# Patient Record
Sex: MALE | Race: OTHER | HISPANIC OR LATINO | Employment: UNEMPLOYED | URBAN - METROPOLITAN AREA
[De-identification: names, ages, dates, MRNs, and addresses within clinical notes are randomized per-mention and may not be internally consistent; named-entity substitution may affect disease eponyms.]

---

## 2019-02-27 ENCOUNTER — HOSPITAL ENCOUNTER (EMERGENCY)
Facility: HOSPITAL | Age: 24
Discharge: HOME | End: 2019-02-27
Attending: EMERGENCY MEDICINE | Admitting: EMERGENCY MEDICINE

## 2019-02-27 VITALS
DIASTOLIC BLOOD PRESSURE: 65 MMHG | HEIGHT: 72 IN | BODY MASS INDEX: 32.31 KG/M2 | SYSTOLIC BLOOD PRESSURE: 117 MMHG | TEMPERATURE: 98.1 F | HEART RATE: 80 BPM | RESPIRATION RATE: 16 BRPM | WEIGHT: 238.54 LBS | OXYGEN SATURATION: 98 %

## 2019-02-27 DIAGNOSIS — R51.9 ACUTE INTRACTABLE HEADACHE, UNSPECIFIED HEADACHE TYPE: Primary | ICD-10-CM

## 2019-02-27 PROCEDURE — 96365 THER/PROPH/DIAG IV INF INIT: CPT

## 2019-02-27 PROCEDURE — 96361 HYDRATE IV INFUSION ADD-ON: CPT

## 2019-02-27 PROCEDURE — 99283 EMERGENCY DEPT VISIT LOW MDM: CPT

## 2019-02-27 PROCEDURE — 96375 TX/PRO/DX INJ NEW DRUG ADDON: CPT

## 2019-02-27 PROCEDURE — 96367 TX/PROPH/DG ADDL SEQ IV INF: CPT

## 2019-02-27 RX ORDER — MORPHINE SULFATE 4 MG/ML
6 INJECTION, SOLUTION INTRAMUSCULAR; INTRAVENOUS ONCE
Status: COMPLETED | OUTPATIENT
Start: 2019-02-27 | End: 2019-02-27

## 2019-02-27 RX ORDER — BUTALBITAL, ACETAMINOPHEN AND CAFFEINE 50; 325; 40 MG/1; MG/1; MG/1
1 TABLET ORAL EVERY 4 HOURS PRN
Qty: 30 TABLET | Refills: 0 | Status: SHIPPED | OUTPATIENT
Start: 2019-02-27

## 2019-02-27 RX ORDER — DIPHENHYDRAMINE HYDROCHLORIDE 50 MG/ML
25 INJECTION INTRAMUSCULAR; INTRAVENOUS ONCE
Status: COMPLETED | OUTPATIENT
Start: 2019-02-27 | End: 2019-02-27

## 2019-02-27 RX ORDER — DEXAMETHASONE SODIUM PHOSPHATE 4 MG/ML
10 INJECTION, SOLUTION INTRA-ARTICULAR; INTRALESIONAL; INTRAMUSCULAR; INTRAVENOUS; SOFT TISSUE ONCE
Status: COMPLETED | OUTPATIENT
Start: 2019-02-27 | End: 2019-02-27

## 2019-02-27 RX ORDER — ACETAMINOPHEN 325 MG/1
975 TABLET ORAL ONCE
Status: COMPLETED | OUTPATIENT
Start: 2019-02-27 | End: 2019-02-27

## 2019-02-27 RX ORDER — KETOROLAC TROMETHAMINE 30 MG/ML
15 INJECTION, SOLUTION INTRAMUSCULAR; INTRAVENOUS ONCE
Status: COMPLETED | OUTPATIENT
Start: 2019-02-27 | End: 2019-02-27

## 2019-02-27 RX ORDER — METOCLOPRAMIDE HYDROCHLORIDE 5 MG/ML
10 INJECTION INTRAMUSCULAR; INTRAVENOUS ONCE
Status: COMPLETED | OUTPATIENT
Start: 2019-02-27 | End: 2019-02-27

## 2019-02-27 RX ORDER — MAGNESIUM SULFATE HEPTAHYDRATE 40 MG/ML
2 INJECTION, SOLUTION INTRAVENOUS ONCE
Status: COMPLETED | OUTPATIENT
Start: 2019-02-27 | End: 2019-02-27

## 2019-02-27 RX ORDER — HYDROMORPHONE HCL/PF 1 MG/ML
1 SYRINGE (ML) INJECTION ONCE
Status: DISCONTINUED | OUTPATIENT
Start: 2019-02-27 | End: 2019-02-27

## 2019-02-27 RX ADMIN — ACETAMINOPHEN 975 MG: 325 TABLET, FILM COATED ORAL at 04:39

## 2019-02-27 RX ADMIN — DIPHENHYDRAMINE HYDROCHLORIDE 25 MG: 50 INJECTION INTRAMUSCULAR; INTRAVENOUS at 02:18

## 2019-02-27 RX ADMIN — SODIUM CHLORIDE 1000 ML: 0.9 INJECTION, SOLUTION INTRAVENOUS at 02:16

## 2019-02-27 RX ADMIN — MAGNESIUM SULFATE IN WATER 2 G: 40 INJECTION, SOLUTION INTRAVENOUS at 05:59

## 2019-02-27 RX ADMIN — SODIUM CHLORIDE 1000 ML: 0.9 INJECTION, SOLUTION INTRAVENOUS at 04:39

## 2019-02-27 RX ADMIN — VALPROATE SODIUM 500 MG: 100 INJECTION, SOLUTION INTRAVENOUS at 04:51

## 2019-02-27 RX ADMIN — METOCLOPRAMIDE 10 MG: 5 INJECTION, SOLUTION INTRAMUSCULAR; INTRAVENOUS at 02:20

## 2019-02-27 RX ADMIN — DEXAMETHASONE SODIUM PHOSPHATE 10 MG: 4 INJECTION, SOLUTION INTRAMUSCULAR; INTRAVENOUS at 02:46

## 2019-02-27 RX ADMIN — KETOROLAC TROMETHAMINE 15 MG: 30 INJECTION, SOLUTION INTRAMUSCULAR at 02:16

## 2019-02-27 RX ADMIN — MORPHINE SULFATE 6 MG: 4 INJECTION INTRAVENOUS at 02:49

## 2019-02-27 NOTE — ED PROVIDER NOTES
History  Chief Complaint   Patient presents with    Headache     Pt reports headache all week reports has been taking OTC medication with no relief  Reports blurry vision and photosensitivity  Denies hx of migranes     Pt is a 20year old male with no PMH presenting with left sided headache x 1 week  Pt states he has had a constant throbbing headache on the left side "behind his eye" that has been getting worse over the past week  He has taken Excedrin and Tylenol without relief  He began to have blurry vision in the left eye starting today  He denies a history of migraines, but has a family history  He has associated photophobia and phonophobia  He denies fever, loss of vision, weakness or numbness, changes in speech, facial asymmetry  Denies eye redness or discharge  No specific onset of symptoms  Denies chest pain, SOB, n/v/d  No family history of SAH  None       History reviewed  No pertinent past medical history  History reviewed  No pertinent surgical history  History reviewed  No pertinent family history  I have reviewed and agree with the history as documented      Social History     Tobacco Use    Smoking status: Never Smoker    Smokeless tobacco: Never Used   Substance Use Topics    Alcohol use: Yes     Comment: once a month    Drug use: Never        Review of Systems    Physical Exam  Physical Exam    Vital Signs  ED Triage Vitals [02/27/19 0113]   Temperature Pulse Respirations Blood Pressure SpO2   97 8 °F (36 6 °C) 63 16 129/88 98 %      Temp Source Heart Rate Source Patient Position - Orthostatic VS BP Location FiO2 (%)   Oral Monitor Lying Right arm --      Pain Score       Worst Possible Pain           Vitals:    02/27/19 0113 02/27/19 0312 02/27/19 0510   BP: 129/88 114/59 122/60   Pulse: 63 60 73   Patient Position - Orthostatic VS: Lying Lying Lying       Visual Acuity      ED Medications  Medications   magnesium sulfate 2 g/50 mL IVPB (premix) 2 g (2 g Intravenous New Bag 2/27/19 0559)   metoclopramide (REGLAN) injection 10 mg (10 mg Intravenous Given 2/27/19 0220)   diphenhydrAMINE (BENADRYL) injection 25 mg (25 mg Intravenous Given 2/27/19 0218)   sodium chloride 0 9 % bolus 1,000 mL (0 mL Intravenous Stopped 2/27/19 0434)   ketorolac (TORADOL) injection 15 mg (15 mg Intravenous Given 2/27/19 0216)   dexamethasone (DECADRON) injection 10 mg (10 mg Intravenous Given 2/27/19 0246)   morphine (PF) 4 mg/mL injection 6 mg (6 mg Intravenous Given 2/27/19 0249)   acetaminophen (TYLENOL) tablet 975 mg (975 mg Oral Given 2/27/19 0439)   valproate (DEPACON) 500 mg in sodium chloride 0 9 % 50 mL IVPB (0 mg Intravenous Stopped 2/27/19 0555)   sodium chloride 0 9 % bolus 1,000 mL (1,000 mL Intravenous New Bag 2/27/19 0439)       Diagnostic Studies  Results Reviewed     None                 No orders to display              Procedures  Procedures       Phone Contacts  ED Phone Contact    ED Course  ED Course as of Feb 27 0644 Wed Feb 27, 2019   0315 Headache not improved with IV fluids, Toradol, Reglan and Benadryl  Will give 10 mg of dexamethasone and 6 mg of morphine  8826 Patient complained to nurse that he was still having headache, but he is sleeping and snoring in his room  Will reassess  1614 Patient still in pain  Given IV magnesium, Depacon, Tylenol  Will reassess  MDM  Number of Diagnoses or Management Options  Diagnosis management comments: Patient was originally given IV fluids, Toradol, Reglan and Benadryl  His symptoms did not improve, and he was given IV morphine and dexamethasone  Symptoms still did not improve, and he was given IV Depacon  Symptoms improved, but patient still wanted IV magnesium  He was offered admission for refractory headache, but he refused  States he wants to go home after IV magnesium  Given Fioricet for home use  Follow up with PCP  Educated on return precautions  Stable and ready for discharge  Disposition  Final diagnoses:   None     ED Disposition     None      Follow-up Information    None         Patient's Medications    No medications on file     No discharge procedures on file      ED Provider  Electronically Signed by           Edward Kidd PA-C  02/27/19 9669